# Patient Record
Sex: FEMALE | Race: WHITE | Employment: FULL TIME | URBAN - METROPOLITAN AREA
[De-identification: names, ages, dates, MRNs, and addresses within clinical notes are randomized per-mention and may not be internally consistent; named-entity substitution may affect disease eponyms.]

---

## 2017-01-11 ENCOUNTER — OFFICE VISIT (OUTPATIENT)
Dept: FAMILY MEDICINE | Facility: CLINIC | Age: 57
End: 2017-01-11
Payer: COMMERCIAL

## 2017-01-11 VITALS
HEIGHT: 64 IN | HEART RATE: 80 BPM | DIASTOLIC BLOOD PRESSURE: 76 MMHG | BODY MASS INDEX: 34.76 KG/M2 | SYSTOLIC BLOOD PRESSURE: 140 MMHG | WEIGHT: 203.6 LBS

## 2017-01-11 DIAGNOSIS — F43.20 ADJUSTMENT DISORDER, UNSPECIFIED TYPE: Primary | ICD-10-CM

## 2017-01-11 DIAGNOSIS — F32.A DEPRESSION, UNSPECIFIED DEPRESSION TYPE: ICD-10-CM

## 2017-01-11 PROCEDURE — 99213 OFFICE O/P EST LOW 20 MIN: CPT | Performed by: FAMILY MEDICINE

## 2017-01-11 RX ORDER — LORAZEPAM 0.5 MG/1
0.5 TABLET ORAL 2 TIMES DAILY PRN
Qty: 60 TABLET | Refills: 0 | Status: SHIPPED | OUTPATIENT
Start: 2017-01-11

## 2017-01-11 RX ORDER — CITALOPRAM HYDROBROMIDE 40 MG/1
TABLET ORAL
Qty: 90 TABLET | Refills: 3 | Status: SHIPPED | OUTPATIENT
Start: 2017-01-11 | End: 2018-01-12

## 2017-01-11 ASSESSMENT — ANXIETY QUESTIONNAIRES
3. WORRYING TOO MUCH ABOUT DIFFERENT THINGS: NOT AT ALL
5. BEING SO RESTLESS THAT IT IS HARD TO SIT STILL: NOT AT ALL
7. FEELING AFRAID AS IF SOMETHING AWFUL MIGHT HAPPEN: NEARLY EVERY DAY
1. FEELING NERVOUS, ANXIOUS, OR ON EDGE: NEARLY EVERY DAY
2. NOT BEING ABLE TO STOP OR CONTROL WORRYING: NEARLY EVERY DAY
GAD7 TOTAL SCORE: 12
IF YOU CHECKED OFF ANY PROBLEMS ON THIS QUESTIONNAIRE, HOW DIFFICULT HAVE THESE PROBLEMS MADE IT FOR YOU TO DO YOUR WORK, TAKE CARE OF THINGS AT HOME, OR GET ALONG WITH OTHER PEOPLE: NOT DIFFICULT AT ALL
6. BECOMING EASILY ANNOYED OR IRRITABLE: NOT AT ALL

## 2017-01-11 ASSESSMENT — PATIENT HEALTH QUESTIONNAIRE - PHQ9: 5. POOR APPETITE OR OVEREATING: NEARLY EVERY DAY

## 2017-01-11 NOTE — NURSING NOTE
"Chief Complaint   Patient presents with     Anxiety     would like to see about getting on something       Initial /84 mmHg  Pulse 89  Ht 5' 3.5\" (1.613 m)  Wt 203 lb 9.6 oz (92.352 kg)  BMI 35.50 kg/m2  LMP 01/28/2008 Estimated body mass index is 35.5 kg/(m^2) as calculated from the following:    Height as of this encounter: 5' 3.5\" (1.613 m).    Weight as of this encounter: 203 lb 9.6 oz (92.352 kg).  BP completed using cuff size: regular  Regi Card CMA      "

## 2017-01-11 NOTE — PROGRESS NOTES
SUBJECTIVE:                                                    Bella Shaver is a 56 year old female who presents to clinic today for the following health issues:    Just 2 weeks ago her  had an abnormal chest CT and she is worried about cancer.  Her  had a bronchoscopy 5 days ago and they are waiting for the biopsy.  She is extremely anxious and appropriately depressed. She is worried about the worst thing that could happen.  She is ruminating about lung cancer. She is having difficulty functioning average as a .  She cries in the exam room.    Chief Complaint   Patient presents with     Anxiety     would like to see about getting on something             Problem list and histories reviewed & adjusted, as indicated.  Additional history: as documented    Medical, surgical, family, social histories, allergies and meds reviewed and updated.    ROS:  General: No change in weight, sleep or appetite.  Normal energy.  No fever or chills  Resp: No coughing, wheezing or shortness of breath  CV: No chest pains or palpitations  GI: nausea    Exam:  GENERAL APPEARANCE ADULT: upset  PSYCH: mentation appears normal., crying    ASSESSMENT:  (F43.20) Adjustment disorder, unspecified type  (primary encounter diagnosis)  Comment:   Plan: citalopram (CELEXA) 40 MG tablet, LORazepam         (ATIVAN) 0.5 MG tablet            (F32.9) Depression, unspecified depression type  Comment:   Plan: citalopram (CELEXA) 40 MG tablet              PLAN:  Orders Placed This Encounter     citalopram (CELEXA) 40 MG tablet     LORazepam (ATIVAN) 0.5 MG tablet   Recheck in 6 weeks or sooner if worse or not improving.    There are no Patient Instructions on file for this visit.      Satinder Allan

## 2017-01-12 ASSESSMENT — PATIENT HEALTH QUESTIONNAIRE - PHQ9: SUM OF ALL RESPONSES TO PHQ QUESTIONS 1-9: 5

## 2017-01-12 ASSESSMENT — ANXIETY QUESTIONNAIRES: GAD7 TOTAL SCORE: 12

## 2017-01-16 PROBLEM — F32.A DEPRESSION, UNSPECIFIED DEPRESSION TYPE: Status: ACTIVE | Noted: 2017-01-16

## 2017-02-21 ENCOUNTER — TELEPHONE (OUTPATIENT)
Dept: NURSING | Facility: CLINIC | Age: 57
End: 2017-02-21

## 2017-02-21 ENCOUNTER — TELEPHONE (OUTPATIENT)
Dept: FAMILY MEDICINE | Facility: CLINIC | Age: 57
End: 2017-02-21

## 2017-02-21 NOTE — TELEPHONE ENCOUNTER
Patient reports that [facility] contacted this clinic to ask for an order to refill Naproxen. Says that she did not request this refill and does not want the medication refilled. Asked this nurse leave a message for clinic   Priyanka Miller RN  [facility]

## 2017-02-21 NOTE — TELEPHONE ENCOUNTER
"Call Type: Triage Call    Presenting Problem: \"[facility] sent a request to have Naproxen refilled. I  told them that I don't need the medication and they told me to let  my doctor know.\"  Triage Note:  Guideline Title: Medication Questions - Adult  Recommended Disposition: Speak with Provider or Pharmacist  within 24 hours  Original Inclination: Did not know what to do  Override Disposition:  Intended Action: Call PCP/HCP  Physician Contacted: No  Has questions about prescribed and/or nonprescribed medications not covered by  available resources ?  YES  Pregnant and has medication questions regarding prescribed and/or nonprescribed  medication(s) not covered by available resources ? NO  Breastfeeding and has medication questions regarding prescribed and/or  nonprescribed medication(s) not covered by available resources ? NO  Sign(s) or symptom(s) associated with a diagnosed condition or with a new illness  ? NO  Prescription ordered today and not available at pharmacy putting patient at  clinical risk ? NO  Recurrence of a symptom(s) or illness post prescribed medication treatment AND  provider instructed patient to call if symptom(s) returned. ? NO  Unable to obtain prescribed medication related to available resources AND  situation poses immediate clinical risk ? NO  Pharmacy calling to clarify prescription order. ? NO  Requests refill of prescribed medication that does NOT have a valid refill; lack  of medication may cause clinical risk to patient if not available. ? NO  Requests refill of prescribed medication without valid refills OR requests refill  of prescribed medication with valid refills but does not have prescription number  (no RX container); lack of medication does not put patient at clinical risk ? NO  Physician Instructions:  Care Advice: Safe Use of Medications: - Keep a list of your medications,  listing both brand and generic names, the prescribed dosage, common side  effects, recommended action for " side effects, when to call their provider,  and any other specific instructions. This medication list should be updated  if any prescriptions change or if new medications are added. Your list  should include nonprescription medications, vitamins and supplements. An  online resource for a medication list is:  http://www.ahrq.gov/patients-consumers/diagnosis-treatment/treatments/safeme  ds/walletform.html or  http://www.ahrq.gov/patients-consumers/diagnosis-treatment/treatments/safeme  ds/yourmeds.pdf     Ask about your medications interaction with other  medications, supplements or foods. - Take the medication list to each  provider visit, especially if seeing more than one doctor. Make note of any  known allergies on this list. - Use your medication exactly as directed.  Any concerns about side effects should be discussed with your pharmacist or  prescribing provider before changing doses or stopping the medication. -  Don't chew or crush tablets or open capsules unless told to do so.  Some  long-acting medications can be absorbed too quickly when chewed or crushed.  Other medications either won't be effective or could cause side effects. -  If you have difficulty swallowing pills, ask your provider or the  pharmacist if the medication is available in liquid form. - For liquid  medications, only use measuring device that came with it or was provided by  the pharmacy. Household teaspoons and tablespoons can be inaccurate. -  Never take anyone else's medication.

## 2017-02-23 DIAGNOSIS — M65.4 RADIAL STYLOID TENOSYNOVITIS OF LEFT HAND: ICD-10-CM

## 2017-02-23 RX ORDER — NAPROXEN 500 MG/1
500 TABLET ORAL 2 TIMES DAILY PRN
Qty: 30 TABLET | Refills: 1 | OUTPATIENT
Start: 2017-02-23

## 2017-02-23 NOTE — TELEPHONE ENCOUNTER
naproxen (NAPROSYN) 500 MG tablet    Last Written Prescription Date: 6/17/2016  Last Quantity: 30, # refills: 1  Last Office Visit with G, [facility] or [facility] prescribing provider: 1/11/2017       Creatinine   Date Value Ref Range Status   04/22/2006 0.90 0.60 - 1.30 mg/dL Final     No results found for: AST  No results found for: ALT  BP Readings from Last 3 Encounters:   01/11/17 140/76   06/17/16 139/78   06/01/15 136/86

## 2017-02-23 NOTE — TELEPHONE ENCOUNTER
Routing refill request to provider for review/approval because:  Labs not current:  Cr, AST/ALT    Thank you  Sherrie CRUZ RN

## 2017-12-29 ENCOUNTER — TELEPHONE (OUTPATIENT)
Dept: FAMILY MEDICINE | Facility: CLINIC | Age: 57
End: 2017-12-29

## 2017-12-29 DIAGNOSIS — Z12.11 SPECIAL SCREENING FOR MALIGNANT NEOPLASMS, COLON: Primary | ICD-10-CM

## 2017-12-29 ASSESSMENT — PATIENT HEALTH QUESTIONNAIRE - PHQ9: SUM OF ALL RESPONSES TO PHQ QUESTIONS 1-9: 0

## 2017-12-29 NOTE — TELEPHONE ENCOUNTER
Panel Management Review      Patient has the following on her problem list:     Depression / Dysthymia review    Measure:  Needs PHQ-9 score of 4 or less during index window.  Administer PHQ-9 and if score is 5 or more, send encounter to provider for next steps.    5   7 month window range:     PHQ-9 SCORE 1/11/2017   Total Score 5       If PHQ-9 recheck is 5 or more, route to provider for next steps.    Patient is due for:  PHQ9        Composite cancer screening  Chart review shows that this patient is due/due soon for the following Pap Smear and Fecal Colorectal (FIT)  Summary:    Patient is due/failing the following:   FIT, PAP and PHQ9    Action needed:   Patient needs office visit for physical. and Patient needs to do PHQ9.    Type of outreach:    Phone, spoke to patient.  had pap completed at [facility]. Patient agreed to fit and has no depression questions.     Questions for provider review:    None                                                                                                                                    Kat Platt MA       Chart routed to Care Team .

## 2018-01-12 DIAGNOSIS — F32.A DEPRESSION, UNSPECIFIED DEPRESSION TYPE: ICD-10-CM

## 2018-01-12 DIAGNOSIS — F43.20 ADJUSTMENT DISORDER, UNSPECIFIED TYPE: ICD-10-CM

## 2018-01-12 RX ORDER — CITALOPRAM HYDROBROMIDE 40 MG/1
40 TABLET ORAL DAILY
Qty: 30 TABLET | Refills: 0 | Status: SHIPPED | OUTPATIENT
Start: 2018-01-12 | End: 2018-03-13

## 2018-01-12 NOTE — TELEPHONE ENCOUNTER
"Requested Prescriptions   Pending Prescriptions Disp Refills     citalopram (CELEXA) 40 MG tablet [Pharmacy Med Name: CITALOPRAM HBR 40 MG TABLET]  Last Written Prescription Date:  01/11/2017  Last Fill Quantity: 90,  # refills: 3   Last Office Visit with [facility], [facility] or [facility] prescribing provider:  01/11/2017   Future Office Visit:      90 tablet 3     Sig: TAKE 1/2 TABLET BY MOUTH DAILY FOR 1 WEEK, THEN INCREASE TO 1 TABLET BY MOUTH DAILY.    SSRIs Protocol Failed    1/12/2018 12:58 AM         Failed - Recent (6 mo) or future visit with authorizing provider's specialty    Patient had office visit in the last 6 months or has a visit in the next 30 days with authorizing provider.  See \"Patient Info\" tab in inbasket, or \"Choose Columns\" in Meds & Orders section of the refill encounter.          Passed - PHQ-9 score less than 5 in past 6 months    The PHQ-9 criteria is meant to fail. It requires a PHQ-9 score review         Passed - Patient is age 18 or older       Passed - No active pregnancy on record       Passed - No positive pregnancy test in last 12 months          "

## 2018-02-11 DIAGNOSIS — F43.20 ADJUSTMENT DISORDER, UNSPECIFIED TYPE: ICD-10-CM

## 2018-02-11 DIAGNOSIS — F32.A DEPRESSION, UNSPECIFIED DEPRESSION TYPE: ICD-10-CM

## 2018-02-12 NOTE — TELEPHONE ENCOUNTER
"Requested Prescriptions   Pending Prescriptions Disp Refills     citalopram (CELEXA) 40 MG tablet [Pharmacy Med Name: CITALOPRAM HBR 40 MG TABLET] 30 tablet 0     Sig: TAKE 1 TABLET (40 MG) BY MOUTH DAILY (NEEDS FOLLOW-UP APPOINTMENT FOR THIS MEDICATION)    SSRIs Protocol Failed    2/11/2018  7:55 AM       Failed - Recent (6 mo) or future visit with authorizing provider's specialty    Patient had office visit in the last 6 months or has a visit in the next 30 days with authorizing provider.  See \"Patient Info\" tab in inbasket, or \"Choose Columns\" in Meds & Orders section of the refill encounter.           Passed - PHQ-9 score less than 5 in past 6 months    The PHQ-9 criteria is meant to fail. It requires a PHQ-9 score review         Passed - Patient is age 18 or older       Passed - No active pregnancy on record       Passed - No positive pregnancy test in last 12 months        Last Written Prescription Date:  1/12/18  Last Fill Quantity: 30,  # refills: 0   Last office visit: 1/11/2017 with prescribing provider:  1/11/17   Future Office Visit:      "

## 2018-02-13 RX ORDER — CITALOPRAM HYDROBROMIDE 40 MG/1
TABLET ORAL
Qty: 30 TABLET | Refills: 0 | OUTPATIENT
Start: 2018-02-13

## 2018-02-13 NOTE — TELEPHONE ENCOUNTER
Patient advised she needs to be seen. She will call back to schedule. Has enough meds to last until appointment.    Chelo Hill RN

## 2018-03-13 ENCOUNTER — OFFICE VISIT (OUTPATIENT)
Dept: FAMILY MEDICINE | Facility: CLINIC | Age: 58
End: 2018-03-13
Payer: COMMERCIAL

## 2018-03-13 VITALS
OXYGEN SATURATION: 94 % | SYSTOLIC BLOOD PRESSURE: 121 MMHG | DIASTOLIC BLOOD PRESSURE: 71 MMHG | HEIGHT: 64 IN | TEMPERATURE: 97.2 F | RESPIRATION RATE: 12 BRPM | WEIGHT: 210 LBS | HEART RATE: 78 BPM | BODY MASS INDEX: 35.85 KG/M2

## 2018-03-13 DIAGNOSIS — Z13.1 SCREENING FOR DIABETES MELLITUS: ICD-10-CM

## 2018-03-13 DIAGNOSIS — F43.20 ADJUSTMENT DISORDER, UNSPECIFIED TYPE: ICD-10-CM

## 2018-03-13 DIAGNOSIS — Z13.6 CARDIOVASCULAR SCREENING; LDL GOAL LESS THAN 160: ICD-10-CM

## 2018-03-13 DIAGNOSIS — F32.A DEPRESSION, UNSPECIFIED DEPRESSION TYPE: Primary | ICD-10-CM

## 2018-03-13 DIAGNOSIS — Z12.11 SPECIAL SCREENING FOR MALIGNANT NEOPLASMS, COLON: ICD-10-CM

## 2018-03-13 LAB
CHOLEST SERPL-MCNC: 266 MG/DL
GLUCOSE SERPL-MCNC: 100 MG/DL (ref 70–99)
HDLC SERPL-MCNC: 44 MG/DL
LDLC SERPL CALC-MCNC: 185 MG/DL
NONHDLC SERPL-MCNC: 222 MG/DL
TRIGL SERPL-MCNC: 183 MG/DL

## 2018-03-13 PROCEDURE — 80061 LIPID PANEL: CPT | Performed by: FAMILY MEDICINE

## 2018-03-13 PROCEDURE — 36415 COLL VENOUS BLD VENIPUNCTURE: CPT | Performed by: FAMILY MEDICINE

## 2018-03-13 PROCEDURE — 82947 ASSAY GLUCOSE BLOOD QUANT: CPT | Performed by: FAMILY MEDICINE

## 2018-03-13 PROCEDURE — 99213 OFFICE O/P EST LOW 20 MIN: CPT | Performed by: FAMILY MEDICINE

## 2018-03-13 RX ORDER — CITALOPRAM HYDROBROMIDE 40 MG/1
40 TABLET ORAL DAILY
Qty: 90 TABLET | Refills: 3 | Status: SHIPPED | OUTPATIENT
Start: 2018-03-13 | End: 2019-03-15

## 2018-03-13 ASSESSMENT — PAIN SCALES - GENERAL: PAINLEVEL: NO PAIN (0)

## 2018-03-13 NOTE — PROGRESS NOTES
"  SUBJECTIVE:   Bella Shaver is a 57 year old female who presents to clinic today for the following health issues:    She has been on citalopram for quite some time is doing well on it.  She is not interested in changing the dose.      Medication Followup of citalopram    Taking Medication as prescribed: yes    Side Effects:  None    Medication Helping Symptoms:  yes           Problem list and histories reviewed & adjusted, as indicated.  Additional history: as documented    Patient Active Problem List   Diagnosis     Pure hypercholesterolemia     Obesity     Depression, unspecified depression type     Past Surgical History:   Procedure Laterality Date     SURGICAL HISTORY OF -       Bilateral cataracts     SURGICAL HISTORY OF -        x1        Social History   Substance Use Topics     Smoking status: Never Smoker     Smokeless tobacco: Never Used     Alcohol use Yes      Comment: occas.     Family History   Problem Relation Age of Onset     DIABETES Father            Reviewed and updated as needed this visit by clinical staff  Tobacco  Allergies  Med Hx  Surg Hx  Fam Hx  Soc Hx      Reviewed and updated as needed this visit by Provider         ROS:  Constitutional, HEENT, cardiovascular, pulmonary, gi and gu systems are negative, except as otherwise noted.    OBJECTIVE:     /71 (BP Location: Right arm, Patient Position: Chair, Cuff Size: Adult Large)  Pulse 78  Temp 97.2  F (36.2  C) (Tympanic)  Resp 12  Ht 5' 3.5\" (1.613 m)  Wt 210 lb (95.3 kg)  LMP 2008  SpO2 94%  Breastfeeding? No  BMI 36.62 kg/m2  Body mass index is 36.62 kg/(m^2).  GENERAL: healthy, alert and no distress  PSYCH: mentation appears normal, affect normal/bright        ASSESSMENT/PLAN:               ICD-10-CM    1. Depression, unspecified depression type F32.9 citalopram (CELEXA) 40 MG tablet     DEPRESSION ACTION PLAN (DAP)   2. Adjustment disorder, unspecified type F43.20 citalopram (CELEXA) 40 MG " tablet   3. Special screening for malignant neoplasms, colon Z12.11 GASTROENTEROLOGY ADULT REF PROCEDURE ONLY [facility] [phone]; [place] [facility] Surgeon (Dr. Bustillos)   4. Screening for diabetes mellitus Z13.1 Glucose   5. CARDIOVASCULAR SCREENING; LDL GOAL LESS THAN 160 Z13.6 Lipid panel reflex to direct LDL Fasting       Recheck in one year.    Satinder Allan MD  [facility]

## 2018-03-13 NOTE — LETTER
[facility]  [address]  [facility] [address]  Phone: [phone]      3/14/2018     Bella Shaver  [address]   [place] [address]      Dear Bella:    Thank you for allowing me to participate in your care. Your recent test results were reviewed and listed below.  your bad cholesterol or LDL cholesterol is high at 185.  However your other risk factors are low so you only have a 4% chance of having a heart attack or stroke in the next 10 years.  This risk is so low that you do not need to take cholesterol medications.  But as you get older your risk will get higher and at some point you may want to take a cholesterol medication.  Remainder of labs are normal.     Your results are provided below for your review  Results for orders placed or performed in visit on 03/13/18   Glucose   Result Value Ref Range    Glucose 100 (H) 70 - 99 mg/dL   Lipid panel reflex to direct LDL Fasting   Result Value Ref Range    Cholesterol 266 (H) <200 mg/dL    Triglycerides 183 (H) <150 mg/dL    HDL Cholesterol 44 (L) >49 mg/dL    LDL Cholesterol Calculated 185 (H) <100 mg/dL    Non HDL Cholesterol 222 (H) <130 mg/dL                 Thank you for choosing [facility] [facility]. As a result, please continue with the treatment plan discussed in the office. Return as discussed or sooner if symptoms worsen or fail to improve. If you have any further questions or concerns, please do not hesitate to contact us.      Sincerely,        Dr. Satinder Allan

## 2018-03-13 NOTE — MR AVS SNAPSHOT
After Visit Summary   3/13/2018    Bella Shaver    MRN: [id number]           Patient Information     Date Of Birth          1960        Visit Information        Provider Department      3/13/2018 9:40 AM Satinder Allan MD [facility]        Today's Diagnoses     Special screening for malignant neoplasms, colon    -  1    Adjustment disorder, unspecified type        Depression, unspecified depression type        Screening for diabetes mellitus        CARDIOVASCULAR SCREENING; LDL GOAL LESS THAN 160           Follow-ups after your visit        Additional Services     GASTROENTEROLOGY ADULT REF PROCEDURE ONLY [facility] [phone]; [place] [facility] Surgeon (Dr. Bustillos)       Last Lab Result: Creatinine (mg/dL)       Date                     Value                 04/22/2006               0.90             ----------  Body mass index is 36.62 kg/(m^2).     Needed:  No  Language:  English    Patient will be contacted to schedule procedure.     Please be aware that coverage of these services is subject to the terms and limitations of your health insurance plan.  Call member services at your health plan with any benefit or coverage questions.  Any procedures must be performed at a [facility] facility OR coordinated by your clinic's referral office.    Please bring the following with you to your appointment:    (1) Any X-Rays, CTs or MRIs which have been performed.  Contact the facility where they were done to arrange for  prior to your scheduled appointment.    (2) List of current medications   (3) This referral request   (4) Any documents/labs given to you for this referral                  Who to contact     If you have questions or need follow up information about today's clinic visit or your schedule please contact [facility] directly at [phone].  Normal or non-critical lab and imaging results will be communicated to you by  "MyChart, letter or phone within 4 business days after the clinic has received the results. If you do not hear from us within 7 days, please contact the clinic through [employer] or phone. If you have a critical or abnormal lab result, we will notify you by phone as soon as possible.  Submit refill requests through [employer] or call your pharmacy and they will forward the refill request to us. Please allow 3 business days for your refill to be completed.          Additional Information About Your Visit        [employer] Information     [employer] lets you send messages to your doctor, view your test results, renew your prescriptions, schedule appointments and more. To sign up, go to [email or web address] . Click on \"Log in\" on the left side of the screen, which will take you to the Welcome page. Then click on \"Sign up Now\" on the right side of the page.     You will be asked to enter the access code listed below, as well as some personal information. Please follow the directions to create your username and password.     Your access code is: [email or web address]  Expires: 2018 11:05 AM     Your access code will  in 90 days. If you need help or a new code, please call your [place] clinic or [phone].        Care EveryWhere ID     This is your Care EveryWhere ID. This could be used by other organizations to access your [place] medical records  [id number]        Your Vitals Were     Pulse Temperature Respirations Height Last Period Pulse Oximetry    78 97.2  F (36.2  C) (Tympanic) 12 5' 3.5\" (1.613 m) 2008 94%    Breastfeeding? BMI (Body Mass Index)                No 36.62 kg/m2           Blood Pressure from Last 3 Encounters:   18 121/71   17 140/76   16 139/78    Weight from Last 3 Encounters:   18 210 lb (95.3 kg)   17 203 lb 9.6 oz (92.4 kg)   16 211 lb (95.7 kg)              We Performed the Following     DEPRESSION ACTION PLAN (DAP)     GASTROENTEROLOGY ADULT REF " PROCEDURE ONLY [facility] [phone]; [place] [facility] Surgeon (Dr. Bustillos)     Glucose     Lipid panel reflex to direct LDL Fasting          Where to get your medicines      These medications were sent to [place] [address] [facility] - [place], [place] - [facility] [address] [facility]  [address] [facility], [facility] [address]     Phone:  [phone]     citalopram 40 MG tablet          Primary Care Provider Office Phone # Fax #    [facility] [phone] [address] [facility] [address]        Equal Access to Services     [facility] DONALD : Hadii aad ku hadasho Soomaali, waaxda luqadaha, qaybta kaalmada adeegyada, waxluly mims . So [facility] [phone].    ATENCIÓN: Si habla español, tiene a fournier disposición servicios gratuitos de asistencia lingüística. Ulises al [phone].    We comply with applicable federal civil rights laws and [place] laws. We do not discriminate on the basis of race, color, national origin, age, disability, sex, sexual orientation, or gender identity.            Thank you!     Thank you for choosing [facility]  for your care. Our goal is always to provide you with excellent care. Hearing back from our patients is one way we can continue to improve our services. Please take a few minutes to complete the written survey that you may receive in the mail after your visit with us. Thank you!             Your Updated Medication List - Protect others around you: Learn how to safely use, store and throw away your medicines at www.disposemymeds.org.          This list is accurate as of 3/13/18 11:05 AM.  Always use your most recent med list.                   Brand Name Dispense Instructions for use Diagnosis    citalopram 40 MG tablet    celeXA    90 tablet    Take 1 tablet (40 mg) by mouth daily (Needs follow-up appointment for this medication)    Adjustment disorder, unspecified type, Depression, unspecified depression type        LORazepam 0.5 MG tablet    ATIVAN    60 tablet    Take 1 tablet (0.5 mg) by mouth 2 times daily as needed for anxiety    Adjustment disorder, unspecified type

## 2018-03-14 ASSESSMENT — PATIENT HEALTH QUESTIONNAIRE - PHQ9: SUM OF ALL RESPONSES TO PHQ QUESTIONS 1-9: 0

## 2018-04-05 ENCOUNTER — OFFICE VISIT (OUTPATIENT)
Dept: FAMILY MEDICINE | Facility: CLINIC | Age: 58
End: 2018-04-05
Payer: COMMERCIAL

## 2018-04-05 VITALS
HEART RATE: 80 BPM | BODY MASS INDEX: 37.66 KG/M2 | SYSTOLIC BLOOD PRESSURE: 134 MMHG | TEMPERATURE: 98 F | DIASTOLIC BLOOD PRESSURE: 80 MMHG | RESPIRATION RATE: 20 BRPM | WEIGHT: 216 LBS

## 2018-04-05 DIAGNOSIS — H60.391 INFECTIVE OTITIS EXTERNA, RIGHT: Primary | ICD-10-CM

## 2018-04-05 DIAGNOSIS — H61.22 IMPACTED CERUMEN OF LEFT EAR: ICD-10-CM

## 2018-04-05 PROCEDURE — 99214 OFFICE O/P EST MOD 30 MIN: CPT | Performed by: PHYSICIAN ASSISTANT

## 2018-04-05 RX ORDER — NEOMYCIN SULFATE, POLYMYXIN B SULFATE AND HYDROCORTISONE 10; 3.5; 1 MG/ML; MG/ML; [USP'U]/ML
3 SUSPENSION/ DROPS AURICULAR (OTIC) 4 TIMES DAILY
Qty: 10 ML | Refills: 0 | Status: SHIPPED | OUTPATIENT
Start: 2018-04-05

## 2018-04-05 ASSESSMENT — ENCOUNTER SYMPTOMS
WHEEZING: 0
TINGLING: 0
DIZZINESS: 0
DOUBLE VISION: 0
MYALGIAS: 0
NEUROLOGICAL NEGATIVE: 1
EYE PAIN: 0
NERVOUS/ANXIOUS: 0
BACK PAIN: 0
DIAPHORESIS: 0
INSOMNIA: 0
HEARTBURN: 0
NAUSEA: 0
FEVER: 0
LOSS OF CONSCIOUSNESS: 0
SHORTNESS OF BREATH: 0
SORE THROAT: 0
HEADACHES: 0
FOCAL WEAKNESS: 0
ABDOMINAL PAIN: 0
SEIZURES: 0
PHOTOPHOBIA: 0
EYE REDNESS: 0
DIARRHEA: 0
PALPITATIONS: 0
DEPRESSION: 0
VOMITING: 0
WEIGHT LOSS: 0
BLOOD IN STOOL: 0
BLURRED VISION: 0
SENSORY CHANGE: 0
CONSTIPATION: 0
EYE DISCHARGE: 0
ORTHOPNEA: 0
HEMOPTYSIS: 0
COUGH: 0
WEAKNESS: 0
FREQUENCY: 0
NECK PAIN: 0
SPUTUM PRODUCTION: 0
DYSURIA: 0
HALLUCINATIONS: 0

## 2018-04-05 ASSESSMENT — LIFESTYLE VARIABLES: SUBSTANCE_ABUSE: 0

## 2018-04-05 NOTE — MR AVS SNAPSHOT
"              After Visit Summary   2018    Bella Shaver    MRN: [id number]           Patient Information     Date Of Birth          1960        Visit Information        Provider Department      2018 4:20 PM Hussein Fry PA-C [facility]        Today's Diagnoses     Infective otitis externa, right    -  1    Impacted cerumen of left ear           Follow-ups after your visit        Follow-up notes from your care team     Return if symptoms worsen or fail to improve.      Who to contact     If you have questions or need follow up information about today's clinic visit or your schedule please contact [facility] directly at [phone].  Normal or non-critical lab and imaging results will be communicated to you by MyChart, letter or phone within 4 business days after the clinic has received the results. If you do not hear from us within 7 days, please contact the clinic through MyChart or phone. If you have a critical or abnormal lab result, we will notify you by phone as soon as possible.  Submit refill requests through [employer] or call your pharmacy and they will forward the refill request to us. Please allow 3 business days for your refill to be completed.          Additional Information About Your Visit        MyChart Information     [employer] lets you send messages to your doctor, view your test results, renew your prescriptions, schedule appointments and more. To sign up, go to [email or web address] . Click on \"Log in\" on the left side of the screen, which will take you to the Welcome page. Then click on \"Sign up Now\" on the right side of the page.     You will be asked to enter the access code listed below, as well as some personal information. Please follow the directions to create your username and password.     Your access code is: [email or web address]  Expires: 2018 11:05 AM     Your access code will  in 90 days. If you need help or a new code, " please call your [place] clinic or [phone].        Care EveryWhere ID     This is your Care EveryWhere ID. This could be used by other organizations to access your [place] medical records  [id number]        Your Vitals Were     Pulse Temperature Respirations Last Period BMI (Body Mass Index)       80 98  F (36.7  C) (Tympanic) 20 01/28/2008 37.66 kg/m2        Blood Pressure from Last 3 Encounters:   04/05/18 134/80   03/13/18 121/71   01/11/17 140/76    Weight from Last 3 Encounters:   04/05/18 216 lb (98 kg)   03/13/18 210 lb (95.3 kg)   01/11/17 203 lb 9.6 oz (92.4 kg)              Today, you had the following     No orders found for display         Today's Medication Changes          These changes are accurate as of 4/5/18  4:45 PM.  If you have any questions, ask your nurse or doctor.               Start taking these medicines.        Dose/Directions    neomycin-polymyxin-hydrocortisone 3.5-16493-5 otic suspension   Commonly known as:  CORTISPORIN   Used for:  Infective otitis externa, right, Impacted cerumen of left ear   Started by:  Hussein Fry PA-C        Dose:  3 drop   Place 3 drops into both ears 4 times daily   Quantity:  10 mL   Refills:  0            Where to get your medicines      These medications were sent to [facility] [id number] [place] [address] [address]     Phone:  [phone]     neomycin-polymyxin-hydrocortisone 3.5-01166-0 otic suspension                Primary Care Provider Office Phone # Fax #    [facility] [phone] [address] [facility] [address]        Equal Access to Services     CHARLES BENNETT AH: Ariane Aguirre, austin gamble, nanette kaalmada nancy, kamryn jama. So [facility] [phone].    ATENCIÓN: Si habla español, tiene a fournier disposición servicios gratuitos de asistencia lingüística. Llame al [phone].    We comply with  applicable federal civil rights laws and [place] laws. We do not discriminate on the basis of race, color, national origin, age, disability, sex, sexual orientation, or gender identity.            Thank you!     Thank you for choosing [facility]  for your care. Our goal is always to provide you with excellent care. Hearing back from our patients is one way we can continue to improve our services. Please take a few minutes to complete the written survey that you may receive in the mail after your visit with us. Thank you!             Your Updated Medication List - Protect others around you: Learn how to safely use, store and throw away your medicines at www.disposemymeds.org.          This list is accurate as of 4/5/18  4:45 PM.  Always use your most recent med list.                   Brand Name Dispense Instructions for use Diagnosis    citalopram 40 MG tablet    celeXA    90 tablet    Take 1 tablet (40 mg) by mouth daily (Needs follow-up appointment for this medication)    Adjustment disorder, unspecified type, Depression, unspecified depression type       LORazepam 0.5 MG tablet    ATIVAN    60 tablet    Take 1 tablet (0.5 mg) by mouth 2 times daily as needed for anxiety    Adjustment disorder, unspecified type       neomycin-polymyxin-hydrocortisone 3.5-94905-2 otic suspension    CORTISPORIN    10 mL    Place 3 drops into both ears 4 times daily    Infective otitis externa, right, Impacted cerumen of left ear

## 2018-04-05 NOTE — PROGRESS NOTES
HPI    SUBJECTIVE:   Bella Shaver is a 57 year old female who presents to clinic today for cerumen impaction which she resolved on her own with her right ear at home but now has a tingling sensation in the external canal and her left ear is plugged and she feels there may still be a cerumen impaction present    Ear      Duration: Couple days     Description (location/character/radiation): Both ears. Says she got some stuff out of the right one, but can not get anything out of the left. Also says that the right ear tingles     Intensity:  mild, moderate    Accompanying signs and symptoms: none    History (similar episodes/previous evaluation): None    Precipitating or alleviating factors: None    Therapies tried and outcome: None     Problem list and histories reviewed & adjusted, as indicated.  Additional history: as documented    Patient Active Problem List   Diagnosis     Pure hypercholesterolemia     Obesity     Depression, unspecified depression type     Past Surgical History:   Procedure Laterality Date     SURGICAL HISTORY OF -       Bilateral cataracts     SURGICAL HISTORY OF -        x1        Social History   Substance Use Topics     Smoking status: Never Smoker     Smokeless tobacco: Never Used     Alcohol use Yes      Comment: occas.     Family History   Problem Relation Age of Onset     DIABETES Father          Current Outpatient Prescriptions   Medication Sig Dispense Refill     neomycin-polymyxin-hydrocortisone (CORTISPORIN) 3.5-41602-9 otic suspension Place 3 drops into both ears 4 times daily 10 mL 0     citalopram (CELEXA) 40 MG tablet Take 1 tablet (40 mg) by mouth daily (Needs follow-up appointment for this medication) 90 tablet 3     LORazepam (ATIVAN) 0.5 MG tablet Take 1 tablet (0.5 mg) by mouth 2 times daily as needed for anxiety (Patient not taking: Reported on 2018) 60 tablet 0     Allergies   Allergen Reactions     Penicillins      Labs reviewed in EPIC    Reviewed and  updated as needed this visit by clinical staff       Reviewed and updated as needed this visit by Provider       Review of Systems   Constitutional: Negative for diaphoresis, fever, malaise/fatigue and weight loss.   HENT: Positive for ear pain and hearing loss. Negative for congestion, ear discharge, nosebleeds and sore throat.    Eyes: Negative for blurred vision, double vision, photophobia, pain, discharge and redness.   Respiratory: Negative for cough, hemoptysis, sputum production, shortness of breath and wheezing.    Cardiovascular: Negative for chest pain, palpitations, orthopnea and leg swelling.   Gastrointestinal: Negative for abdominal pain, blood in stool, constipation, diarrhea, heartburn, melena, nausea and vomiting.   Genitourinary: Negative.  Negative for dysuria, frequency and urgency.   Musculoskeletal: Negative for back pain, joint pain, myalgias and neck pain.   Skin: Negative for itching and rash.   Neurological: Negative.  Negative for dizziness, tingling, sensory change, focal weakness, seizures, loss of consciousness, weakness and headaches.   Endo/Heme/Allergies: Negative.    Psychiatric/Behavioral: Negative for depression, hallucinations, substance abuse and suicidal ideas. The patient is not nervous/anxious and does not have insomnia.          Physical Exam   Constitutional: She is oriented to person, place, and time and well-developed, well-nourished, and in no distress. No distress.   HENT:   Head: Normocephalic and atraumatic.   Right Ear: There is swelling.   Left Ear: Tympanic membrane is injected and erythematous.   Nose: Nose normal.   There is a cerumen impaction in the left ear and this was irrigated until clear with some erythema and possible purulence behind the TM but this could be secondary to the irritation from the cerumen and irrigation.  The right ear canal had some swelling and inflammation   Eyes: Conjunctivae and EOM are normal. Pupils are equal, round, and reactive to  light. Right eye exhibits no discharge. Left eye exhibits no discharge. No scleral icterus.   Neck: Normal range of motion. Neck supple. No JVD present. No tracheal deviation present. No thyromegaly present.   Cardiovascular: Normal rate, regular rhythm, normal heart sounds and intact distal pulses.  Exam reveals no gallop and no friction rub.    No murmur heard.  Pulmonary/Chest: Effort normal and breath sounds normal. No stridor. No respiratory distress. She has no wheezes. She has no rales. She exhibits no tenderness.   Abdominal: Soft. Bowel sounds are normal. She exhibits no distension and no mass. There is no tenderness. There is no rebound and no guarding.   Musculoskeletal: Normal range of motion. She exhibits no edema or tenderness.   Lymphadenopathy:     She has no cervical adenopathy.   Neurological: She is alert and oriented to person, place, and time. She has normal reflexes. No cranial nerve deficit. She exhibits normal muscle tone. Gait normal.   Skin: Skin is warm and dry. No rash noted. She is not diaphoretic. No erythema. No pallor.   Psychiatric: Mood, memory, affect and judgment normal.       (H60.391) Infective otitis externa, right  (primary encounter diagnosis)  Comment:  Plan: neomycin-polymyxin-hydrocortisone (CORTISPORIN)        3.5-62570-1 otic suspension            (H61.22) Impacted cerumen of left ear  Comment:   Plan: neomycin-polymyxin-hydrocortisone (CORTISPORIN)        3.5-49954-8 otic suspension          If the hearing does not improve in that left ear we may want to treat with an oral antibiotic because of the effusion that was behind the TM but I think the abnormality is secondary to the cerumen and irrigation  Follow-up if symptoms do not improve

## 2018-04-05 NOTE — NURSING NOTE
"Chief Complaint   Patient presents with     Ear Problem       Initial /80 (BP Location: Right arm, Patient Position: Sitting, Cuff Size: Adult Large)  Pulse 80  Temp 98  F (36.7  C) (Tympanic)  Resp 20  Wt 216 lb (98 kg)  LMP 01/28/2008  BMI 37.66 kg/m2 Estimated body mass index is 37.66 kg/(m^2) as calculated from the following:    Height as of 3/13/18: 5' 3.5\" (1.613 m).    Weight as of this encounter: 216 lb (98 kg).      Health Maintenance that is potentially due pending provider review:  Pap Smear and Colonoscopy/FIT    Patient will schedule physical.    Is there anyone who you would like to be able to receive your results? No  If yes have patient fill out JAS    Candy ALEXANDER CMA    "

## 2019-01-30 ENCOUNTER — TELEPHONE (OUTPATIENT)
Dept: FAMILY MEDICINE | Facility: CLINIC | Age: 59
End: 2019-01-30

## 2019-03-13 DIAGNOSIS — F43.20 ADJUSTMENT DISORDER, UNSPECIFIED TYPE: ICD-10-CM

## 2019-03-13 DIAGNOSIS — F32.A DEPRESSION, UNSPECIFIED DEPRESSION TYPE: ICD-10-CM

## 2019-03-13 RX ORDER — CITALOPRAM HYDROBROMIDE 40 MG/1
40 TABLET ORAL DAILY
Qty: 90 TABLET | Refills: 3 | Status: CANCELLED | OUTPATIENT
Start: 2019-03-13

## 2019-03-13 NOTE — TELEPHONE ENCOUNTER
"Requested Prescriptions   Pending Prescriptions Disp Refills     citalopram (CELEXA) 40 MG tablet 90 tablet 3    Last Written Prescription Date:  3/13/18  Last Fill Quantity: 90 tab,  # refills: 3   Last office visit: 4/5/2018 with prescribing provider:  Lisandra   Future Office Visit:     Sig: Take 1 tablet (40 mg) by mouth daily (Needs follow-up appointment for this medication)    SSRIs Protocol Failed - 3/13/2019 10:29 AM       Failed - PHQ-9 score less than 5 in past 6 months    Please review last PHQ-9 score.          Failed - Recent (6 mo) or future (30 days) visit within the authorizing provider's specialty    Patient had office visit in the last 6 months or has a visit in the next 30 days with authorizing provider or within the authorizing provider's specialty.  See \"Patient Info\" tab in inbasket, or \"Choose Columns\" in Meds & Orders section of the refill encounter.           Passed - Medication is active on med list       Passed - Patient is age 18 or older       Passed - No active pregnancy on record       Passed - No positive pregnancy test in last 12 months          "